# Patient Record
Sex: FEMALE | Race: WHITE | NOT HISPANIC OR LATINO | Employment: UNEMPLOYED | URBAN - METROPOLITAN AREA
[De-identification: names, ages, dates, MRNs, and addresses within clinical notes are randomized per-mention and may not be internally consistent; named-entity substitution may affect disease eponyms.]

---

## 2024-02-16 ENCOUNTER — OFFICE VISIT (OUTPATIENT)
Dept: URGENT CARE | Facility: CLINIC | Age: 59
End: 2024-02-16
Payer: MEDICAID

## 2024-02-16 VITALS
TEMPERATURE: 98 F | WEIGHT: 175 LBS | HEART RATE: 80 BPM | SYSTOLIC BLOOD PRESSURE: 158 MMHG | RESPIRATION RATE: 20 BRPM | OXYGEN SATURATION: 95 % | DIASTOLIC BLOOD PRESSURE: 102 MMHG
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
## 2024-02-16 PROCEDURE — 99204 OFFICE O/P NEW MOD 45 MIN: CPT | Mod: S$GLB,,,

## 2024-02-16 PROCEDURE — 73610 X-RAY EXAM OF ANKLE: CPT | Mod: RT,S$GLB,, | Performed by: RADIOLOGY

## 2024-02-16 PROCEDURE — 72040 X-RAY EXAM NECK SPINE 2-3 VW: CPT | Mod: 59,S$GLB,, | Performed by: RADIOLOGY

## 2024-02-16 PROCEDURE — 72100 X-RAY EXAM L-S SPINE 2/3 VWS: CPT | Mod: S$GLB,,, | Performed by: RADIOLOGY

## 2024-02-16 PROCEDURE — 73130 X-RAY EXAM OF HAND: CPT | Mod: 50,S$GLB,, | Performed by: RADIOLOGY

## 2024-02-16 PROCEDURE — 72040 X-RAY EXAM NECK SPINE 2-3 VW: CPT | Mod: S$GLB,,, | Performed by: RADIOLOGY

## 2024-02-16 PROCEDURE — 73562 X-RAY EXAM OF KNEE 3: CPT | Mod: RT,S$GLB,, | Performed by: RADIOLOGY

## 2024-02-16 PROCEDURE — 70150 X-RAY EXAM OF FACIAL BONES: CPT | Mod: S$GLB,,, | Performed by: RADIOLOGY

## 2024-02-16 NOTE — PROGRESS NOTES
Subjective:      Patient ID: Argentina Harvey is a 58 y.o. female.    Vitals:  weight is 79.4 kg (175 lb). Her oral temperature is 97.7 °F (36.5 °C). Her blood pressure is 158/102 (abnormal) and her pulse is 80. Her respiration is 20 and oxygen saturation is 95%.     Chief Complaint: Fall    Patient in clinic with a chief complaint of a trip and fall onto concrete on 02/08/2024.  She reports she tripped over a drain in a parking lot and falling onto her left side of her face, and striking bilateral hands, and right knee.  She has a associated right ankle pain from twisting ankle on concrete.  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  She reports time of accident she was unable to ambulate due to pain.  She has been in bed, unable to ambulate due to pain until today.  She has taken no medications for her symptoms.  Her range of motion is intact.  She has tenderness over the palm side MCP 2nd finger of bilateral hands.  Cervical tenderness radiates down to bilateral scapulas.  She reports midline pain.  Lower back pain does not radiate, also is midline.  She reports a clicking sensation in the knee when she ambulates.  She has a small abrasion that is in the healing process with no periwound erythema on the lateral aspect of the right knee.  She has no ecchymosis or bruising present.  There is mild effusion present.  Right ankle she has point tenderness over the anterior portion, and there is no ecchymosis or bruising present.  There is no obvious swelling.  She reports pain with range of motion.    Patient reports she does no intention of taking medications, and reports that she was hoping for a referral to physical therapy.  She plans on seeing chiropractor.  Patient was also offered EMS transport to hospital time injury, and declined.  She also refused emergency room care for multiple  "days in attempt to "nurse it myself".    She also reports history of cataract in left eye, and has appointment on Monday.    Fall  The accident occurred More than 1 week ago. The fall occurred while walking. She fell from a height of 3 to 5 ft. She landed on [place]. There was no blood loss. The point of impact was the face and right knee (Hands). The pain is present in the right knee, neck, right hand, left hand and right foot (pt states the right knee is "clicking" and swollen; pt also states that she has trouble "feeling her hands"). The pain is at a severity of 8/10. The pain is severe. The symptoms are aggravated by movement, standing and sitting. Associated symptoms include numbness, tingling and a visual change. Pertinent negatives include no abdominal pain, bowel incontinence, fever, headaches, hearing loss, hematuria, loss of consciousness, nausea or vomiting. She has tried rest, ice and NSAID (ibuprofen) for the symptoms. The treatment provided no relief.       Constitution: Negative for fever.   Neck: Positive for neck pain and neck stiffness.   Cardiovascular: Negative.    Eyes:  Positive for vision loss.   Respiratory: Negative.     Gastrointestinal:  Negative for abdominal pain, nausea, vomiting and bowel incontinence.   Endocrine: negative.   Genitourinary:  Negative for hematuria.   Musculoskeletal:  Positive for joint pain and back pain.   Skin:  Positive for abrasion.   Neurological:  Positive for numbness and tingling. Negative for headaches and loss of consciousness.   Psychiatric/Behavioral: Negative.        Objective:     Physical Exam   Constitutional: She is oriented to person, place, and time. She appears well-developed. She is cooperative.   HENT:   Head: Normocephalic and atraumatic.   Ears:   Right Ear: Hearing and external ear normal. impacted cerumen  Left Ear: Hearing and external ear normal. impacted cerumen  Nose: Nose normal. No mucosal edema or nasal deformity. No epistaxis. Right " sinus exhibits no maxillary sinus tenderness and no frontal sinus tenderness. Left sinus exhibits no maxillary sinus tenderness and no frontal sinus tenderness.   Mouth/Throat: Uvula is midline, oropharynx is clear and moist and mucous membranes are normal. Mucous membranes are moist. No trismus in the jaw. Normal dentition. No uvula swelling. Oropharynx is clear.   Eyes: Conjunctivae and lids are normal. Pupils are equal, round, and reactive to light. Right eye exhibits no chemosis and no exudate. Left eye exhibits no chemosis and no exudate. Right conjunctiva is not injected. Right conjunctiva has no hemorrhage. Left conjunctiva is not injected. Left conjunctiva has no hemorrhage. Left pupil is round, reactive and not sluggish. Extraocular movement intact      Comments: Left pupil hazy opacity   Neck: Trachea normal and phonation normal. Neck supple. There are signs of injury. pain with movement present. spinous process tenderness present. muscular tenderness present.   Cardiovascular: Normal rate, regular rhythm, normal heart sounds and normal pulses.   Pulmonary/Chest: Effort normal. No respiratory distress. She has decreased breath sounds (Diffuse).   Abdominal: Normal appearance. Soft. flat abdomen There is no abdominal tenderness.   Musculoskeletal:         General: Tenderness and signs of injury present.      Right knee: She exhibits effusion. She exhibits no ecchymosis and no erythema. Tenderness found. Lateral joint line tenderness noted.      Right ankle: She exhibits decreased range of motion. She exhibits no swelling, no ecchymosis and no deformity. Tenderness.      Cervical back: She exhibits tenderness.      Right hand: She exhibits tenderness. She exhibits normal range of motion. Decreased sensation noted. Decreased sensation is present in the radial distribution.      Left hand: She exhibits tenderness. She exhibits normal range of motion. Decreased sensation noted. Decreased sensation is present in  the radial distribution.        Legs:    Neurological: no focal deficit. She is alert, oriented to person, place, and time and at baseline. She exhibits normal muscle tone.   Skin: Skin is warm, dry and intact.   Psychiatric: Her speech is normal and behavior is normal. Mood, judgment and thought content normal.   Nursing note and vitals reviewed.      Assessment:     1. Fall, initial encounter        Plan:       Fall, initial encounter  -     X-Ray Hand 3 View Bilateral; Future; Expected date: 02/16/2024  -     XR Cervical Spine 2 or 3 Views; Future; Expected date: 02/16/2024  -     XR LUMBAR SPINE 2 OR 3 VIEWS; Future; Expected date: 02/16/2024  -     XR FACIAL BONES 3 OR MORE VIEW; Future; Expected date: 02/16/2024  -     XR KNEE 3 VIEW RIGHT; Future; Expected date: 02/16/2024  -     XR ANKLE COMPLETE 3 VIEW RIGHT; Future; Expected date: 02/16/2024  -     X-Ray Cervical Spine AP And Lateral; Future; Expected date: 02/16/2024  -     Ambulatory referral/consult to Physical/Occupational Therapy        XR KNEE 3 VIEW RIGHT    Result Date: 2/16/2024  EXAMINATION: XR KNEE 3 VIEW RIGHT CLINICAL HISTORY: Unspecified fall, initial encounter TECHNIQUE: AP, lateral, and Merchant views of the right knee were performed. COMPARISON: None FINDINGS: No fracture, dislocation, or joint effusion.  The soft tissues are unremarkable.     No acute osseous abnormality. Electronically signed by: Jhonatan Ryan MD Date:    02/16/2024 Time:    14:53    XR ANKLE COMPLETE 3 VIEW RIGHT    Result Date: 2/16/2024  EXAMINATION: XR ANKLE COMPLETE 3 VIEW RIGHT CLINICAL HISTORY: Unspecified fall, initial encounter TECHNIQUE: AP, lateral, and oblique images of the right ankle were performed. COMPARISON: None FINDINGS: There is no fracture or dislocation.  There is a moderate plantar heel spur.  The soft tissues are unremarkable.     No acute osseous abnormality. Electronically signed by: Jhonatan Ryan MD Date:    02/16/2024  Time:    14:53    XR FACIAL BONES 3 OR MORE VIEW    Result Date: 2/16/2024  EXAMINATION: XR FACIAL BONES 3 OR MORE VIEW CLINICAL HISTORY: Unspecified fall, initial encounter TECHNIQUE: Four views of the facial bones were performed. COMPARISON: None FINDINGS: No displaced fracture or focal bony abnormality.  No evidence of dislocation.  No abnormal bony lucencies or sclerosis.  No radiopaque foreign body identified.  Major paranasal sinuses appear relatively clear.     No acute radiographic abnormality identified. Electronically signed by: Phillip Rodriguez Date:    02/16/2024 Time:    14:42    XR Cervical Spine 2 or 3 Views    Result Date: 2/16/2024  EXAMINATION: XR CERVICAL SPINE 2 OR 3 VIEWS CLINICAL HISTORY: Unspecified fall, initial encounter TECHNIQUE: AP, lateral and open mouth views of the cervical spine were performed. COMPARISON: None. FINDINGS: Limited radiographs noting only an AP and slightly oblique view of the cervical spine.  No obvious displaced fracture identified.  Coronal alignment is maintained.     Limited radiographs of the cervical spine.  Lack of a lateral view limits evaluation significantly.  No obvious displaced fracture.  Consider repeat exam with lateral view of clinically indicated. Electronically signed by: Phillip Rodriguez Date:    02/16/2024 Time:    14:25         XR LUMBAR SPINE 2 OR 3 VIEWS     FINDINGS:  Vertebral body heights are preserved.  No fractures or bony destructive changes.  Trace retrolisthesis of L2 on L3, L3 on L4, and L5 on S1 in the setting of mild lower lumbar predominant facet arthrosis.  At least moderate degenerative disc height loss suggested from L2-L3 to L5-S1.     Impression:     1. Degenerative changes as discussed above without evidence of an acute radiographic abnormality.     Electronically signed by: Phillip Rodriguez  Date:                                            02/16/2024  Time:                                           15:24    Narrative &  Impression  EXAMINATION:  XR CERVICAL SPINE AP LATERAL     TECHNIQUE:  AP, lateral and open mouth views of the cervical spine were performed.     FINDINGS:  The C7 vertebral level is difficult to assess on the lateral view secondary to overlying shoulders.  No apparent fractures or bony destructive changes.Grade 1 degenerative anterolisthesis of C3 on C4 with straightening of the expected normal cervical lordosis.  Mild to moderate degenerative disc height loss at C5-C6 with shallow endplate centered osteophytes.No abnormal prevertebral soft tissue thickening.     Impression:     1. Degenerative changes as discussed above without evidence of an acute radiographic abnormality.

## 2024-09-12 ENCOUNTER — CLINICAL SUPPORT (OUTPATIENT)
Dept: REHABILITATION | Facility: HOSPITAL | Age: 59
End: 2024-09-12
Payer: MEDICAID

## 2024-09-12 DIAGNOSIS — R26.9 ABNORMALITY OF GAIT AND MOBILITY: ICD-10-CM

## 2024-09-12 DIAGNOSIS — R26.89 BALANCE PROBLEM: Primary | ICD-10-CM

## 2024-09-12 DIAGNOSIS — M25.50 PAIN IN JOINT, MULTIPLE SITES: ICD-10-CM

## 2024-09-12 PROCEDURE — 97161 PT EVAL LOW COMPLEX 20 MIN: CPT | Mod: PO

## 2024-09-12 NOTE — PATIENT INSTRUCTIONS
Shoulder Shrug        Slowly bring shoulders up toward ears, then back down.  Repeat ____ times. Do ____ sessions per day.     [email or web address]://[email or web address].k[employer]oblick.[employer]/452         Copyright © [employer]. All rights reserved.      Shoulder (Scapula) Retraction        Pull shoulders back, squeezing shoulder blades together.  Repeat ____ times per session. Do ____ sessions per week.  Position: Standing     Copyright © [employer]. All rights reserved.      Shoulder Bishop        Move shoulders up and around in a [place], forward and then backward.  Repeat ____ times. Do ____ sessions per day.     https://[email or web address]     Copyright © [employer]. All rights reserved.      Neck: Lateral Tilt        Support child's trunk and shoulder. Start with head in center.  Child gently tilts toward right shoulder. Keep chin tucked.  Do not allow trunk or shoulders to move.  Child may assist.  Hold ____ seconds. Repeat ____ times. Do ____ sessions per day.  CAUTION: Movement should be gentle, steady and slow.    Copyright © [employer]. All rights reserved.      Neck: Rotation        Support child's trunk and shoulder. Start with head in center.  Child gently turns chin toward right shoulder. Do not allow trunk or shoulder to move.  Child may assist.  Hold ____ seconds. Repeat ____ times. Do ____ sessions per day.  CAUTION: Movement should be gentle, steady and slow.    Copyright © [employer]. All rights reserved.      Stretch Break - Chin Tuck        Looking straight forward, tuck chin and hold ____ seconds. Relax and return to starting position.  Repeat ____ times every ____ hours.   (Home) Flexion: Pelvic Tilt        Lie with neck supported, knees bent, feet flat. Tighten and suck stomach in, pushing back down against surface. Do not push down with legs.  Repeat ____ times per set. Do ____ sets per session. Do ____ sessions per week.    Copyright © [employer]. All rights reserved.      Knee-to-Chest Stretch: Unilateral        With hand behind right knee, pull knee in to chest until a  comfortable stretch is felt in lower back and buttocks. Keep back relaxed. Hold ____ seconds.  Repeat ____ times per set. Do ____ sets per session. Do ____ sessions per day.

## 2024-09-12 NOTE — PLAN OF CARE
[facility]  Physical Therapy Neurological Rehabilitation Initial Evaluation     Name: Argentina Harvey  Clinic Number: [id number]    Therapy Diagnosis:   Encounter Diagnoses   Name Primary?    Balance problem Yes    Pain in joint, multiple sites     Abnormality of gait and mobility      Physician: Tommy Dinh FNP    Physician Orders: PT Eval and Treat   Medical Diagnosis from Referral: W19.XXXA (ICD-10-CM) - Fall, initial encounter   Evaluation Date: 9/12/2024  Authorization Period Expiration: 2/15/24  Plan of Care Expiration: 10/31/24    Visit # / Visits authorized: 1/ 1  FOTO: 11/ 100    Precautions: Standard and Fall. Blind left eye    Time In: 1330  Time Out: 1430  Total Billable Time: 60 minutes    Subjective      Date of onset: 2/18/24    referral    History of current condition - Argentina reports: having a bad fall with multiple bruising injury 7 months ago. Had been suffering from neck hip/low back discomforts which is worse lately.   C/o neuropathy to hands and feet. Unable to work the last  2 months. She mentioned pain medications doesn't work with her.     Imaging, Xray 2/16/24 Lumbar spine: having a bad fall with multiple bruising injury 7 months ago. Had been suffering from neck hip/low back discomforts which is worse lately.  Xray C-spine 2/16/24: The C7 vertebral level is difficult to assess on the lateral view secondary to overlying shoulders. No apparent fractures or bony destructive changes.Grade 1 degenerative anterolisthesis of C3 on C4 with straightening of the expected normal cervical lordosis. Mild to moderate degenerative disc height loss at C5-C6 with shallow endplate centered osteophytes.No abnormal prevertebral soft tissue thickening. For pain    Prior Therapy: yes. + chiropractor  Social History: lives alone.    Falls:  7 months ago. None recent   DME: none    Home Environment: 4 steps; tub shower   Exercise Routine / History:  used to go to gym 2x/week,  "swim  Occupation: healthcare consultant  Prior Level of Function: independent  Current Level of Function: unable to go to work, difficulty walking, unable to stand long period. Sometimes takes too long to get out of bed.    Pain:  Current 8/10, worst 10/10, best 4/10   Location: neck, low back, hip   Description: Tight, pressure  Aggravating Factors: Standing, Walking, Morning, and quick movements  Easing Factors: ice, heating pad, hot bath, and biofreeze    Patient's goals: "    Medical History:   No past medical history on file.    Surgical History:   Argentina Harvey  has no past surgical history on file.    Medications:   Argentina has a current medication list which includes the following prescription(s): stelara.    Allergies:   Review of patient's allergies indicates:   Allergen Reactions    Percodan [oxycodone-aspirin] Nausea And Vomiting and Hallucinations        Objective      Posture: Assymetric weight bearing L>R, R sh/hip lower  Palpation: sensitive  and reactive on pressure to neck and low back areas.  Sensation: reports tingling and numbness to both hands and feet  Range of Motion/Strength:   Cervical/Thoracic/Lumbar AROM: Pain/Dysfunction with Movement:   Flexion    Extension    Right side bending    Left side bending    Right rotation    Left rotation      Cervical/Thoracic/Lumbar AROM: Pain/Dysfunction with Movement:   Flexion    Extension    Right side bending    Left side bending    Right rotation    Left rotation       Flexibility: severe guarding with all active movements, neck/trunk;  SLR  R 50 deg  L 70 deg  Gait: Without AD  Analysis: Unsteady gait with reaching wall for support, assymetric weight shifting/swing  Bed Mobility: modified independent  Transfers: Independent  Special Tests: SLR  (-); FABERe (-)  Spurling's deferred    Intake Outcome Measure for FOTO hip Survey    Therapist reviewed FOTO scores for Argentina Harvey on 9/12/2024.   FOTO report - see Media section or FOTO account episode " details.    Intake Score: 11%       Treatment     Total Treatment time separate from Evaluation: 15 minutes    Argentina received the treatments listed below:      therapeutic exercises to develop flexibility and establish home program for 15 minutes including:  Shoulder shrugs  Scapular retraction   \chin tuck  Neck ROM: rotation, lateral titl  Posterior pelvic tilt in supine'\  Knee to chest single      Patient Education and Home Exercises     Education provided:   - Discussed Plan of care; Home exercise Instructions    Written Home Exercises Provided: Yes.  Exercises were reviewed and Argentina was able to demonstrate them prior to the end of the session.  Argentina demonstrated fair  understanding of the education provided.     Assessment     Argentina is a 59 y.o. female referred to outpatient Physical Therapy with a medical diagnosis of fall, initial encounter. Patient presents with multiple areas of pain, very unsteady gait, assymmetric weight bearing L>R,  guarded  movements, reacts to palpation avoiding tenderness. Decreased overall mobility  Patient prognosis is Good.   Patient will benefit from skilled outpatient Physical Therapy to address the deficits stated above and in the chart below, provide patient /family education, and to maximize patient's level of independence.     Plan of care discussed with patient: Yes  Patient's spiritual, cultural and educational needs considered and patient is agreeable to the plan of care and goals as stated below:     Anticipated Barriers for therapy: anxiety, attitude    Medical Necessity is demonstrated by the following  History  Co-morbidities and personal factors that may impact the plan of care [x] LOW: no personal factors / co-morbidities  [] MODERATE: 1-2 personal factors / co-morbidities  [] HIGH: 3+ personal factors / co-morbidities    Moderate / High Support Documentation:   Co-morbidities affecting plan of care: obesity    Personal Factors:   coping style      Examination  Body Structures and Functions, activity limitations and participation restrictions that may impact the plan of care [x] LOW: addressing 1-2 elements  [] MODERATE: 3+ elements  [] HIGH: 4+ elements (please support below)    Moderate / High Support Documentation: blind left eye     Clinical Presentation [x] LOW: stable  [] MODERATE: Evolving  [] HIGH: Unstable     Decision Making/ Complexity Score: low       Goals:  Short Term Goals: 2 weeks   Patient will report compliance to home exercises given.  Patient will tolerate continuous movements 10 minutes on Nustep.  Patient will tolerate unilateral standing to right leg > 10 sec with hold for balance.    Long Term Goals: 6 weeks   Patient will approximate normal gait pattern.  Patient will have decreased pain intensity <3/10 max level  Patient will perform single leg stance X 10 sec without hold to demonstrate functional balance.  Patient will have improved FOTO score < 35/100    Plan     Plan of care Certification: 9/12/2024 to 10/31/24.    Outpatient Physical Therapy 2 times weekly for 6 weeks to include the following interventions: Electrical Stimulation Dry needling, Gait Training, Manual Therapy, Moist Heat/ Ice, Neuromuscular Re-ed, Therapeutic Activities, Therapeutic Exercise, and Ultrasound.     Parag Goyal PT        Physician's Signature: _________________________________________ Date: ________________

## 2024-09-18 ENCOUNTER — TELEPHONE (OUTPATIENT)
Dept: REHABILITATION | Facility: HOSPITAL | Age: 59
End: 2024-09-18

## 2024-09-20 ENCOUNTER — CLINICAL SUPPORT (OUTPATIENT)
Dept: REHABILITATION | Facility: HOSPITAL | Age: 59
End: 2024-09-20
Payer: MEDICAID

## 2024-09-20 DIAGNOSIS — R53.81 DEBILITY: Primary | ICD-10-CM

## 2024-09-20 PROCEDURE — 97110 THERAPEUTIC EXERCISES: CPT | Mod: PO

## 2024-09-20 NOTE — PROGRESS NOTES
[facility]   Physical Therapy Treatment Note      Name: Argentina Harvey  Clinic Number: [id number]    Therapy Diagnosis: No diagnosis found.  Physician: Tommy Dinh FNP    Visit Date: 9/20/2024    [copy and paste header from eval here including time in/out and billable time]  Physician Orders: PT Eval and Treat   Medical Diagnosis from Referral: W19.XXXA (ICD-10-CM) - Fall, initial encounter   Evaluation Date: 9/12/2024  Authorization Period Expiration: 2/15/24  Plan of Care Expiration: 10/31/24     Visit # / Visits authorized: 1/ 1  FOTO: 11/ 100     Precautions: Standard and Fall. Blind left eye     Time In: 1035  Time Out: 1113  Total Billable Time: 38 minutes  PTA Visit #: 0/5     Subjective     Patient reports: c/o of multiple areas,upper back, right hip, low back.  She was compliant with home exercise program.  Response to previous treatment: first visit following eval  Functional change: no    Pain: 8/10  Location: left ankle     Objective      Objective Measures updated at progress report unless specified.     Treatment     Argentina received the treatments listed below:      therapeutic exercises to develop strength, endurance, ROM, flexibility, posture, and core stabilization for 38 minutes including:  Bike 4'  UBE 4'  Mini squat 8  LAQ 20  Functional gait training 2x120'.  manual therapy techniques:  were applied to the:  for  minutes, including:      neuromuscular re-education activities to improve:  for 0 minutes. The following activities were included:      therapeutic activities to improve functional performance for 0  minutes, including:      gait training to improve functional mobility and safety for   minutes, including:      direct contact modalities after being cleared for contraindications:     supervised modalities after being cleared for contradictions: TENS:  Argentina received TENS electrical stimulation for pain to the . Pt received continuous mode at a rate of 2  pps for 0 minutes. Argentina tolerated treatment well without any adverse effects.     Patient Education and Home Exercises       Education provided:   - Gait pattern ed    Written Home Exercises Provided:  to be provided .   Assessment     Antalgic gait.  Very, poor endurance, required ,multiple rest periods.    Argentina Is progressing well towards her goals.   Patient prognosis is Fair.     Patient will continue to benefit from skilled outpatient physical therapy to address the deficits listed in the problem list box on initial evaluation, provide pt/family education and to maximize pt's level of independence in the home and community environment.     Patient's spiritual, cultural and educational needs considered and pt agreeable to plan of care and goals.     Anticipated barriers to physical therapy: no    Goals:   Short Term Goals: 2 weeks   Patient will report compliance to home exercises given.  Patient will tolerate continuous movements 10 minutes on Nustep.  Patient will tolerate unilateral standing to right leg > 10 sec with hold for balance.     Long Term Goals: 6 weeks   Patient will approximate normal gait pattern.  Patient will have decreased pain intensity <3/10 max level  Patient will perform single leg stance X 10 sec without hold to demonstrate functional balance.  Patient will have improved FOTO score < 35/100     Plan     Progress as able.    Jarad Marinelli, PT

## 2024-10-22 ENCOUNTER — TELEPHONE (OUTPATIENT)
Dept: REHABILITATION | Facility: HOSPITAL | Age: 59
End: 2024-10-22
Payer: MEDICAID

## 2024-10-22 NOTE — TELEPHONE ENCOUNTER
Spoke with pt re: missed appts. Stated she has covid right now and is very sick, so she wants to cancel her appts today and Thursday. Confirmed her appt Tuesday and informed pt that is the last scheduled appt for this POC.

## 2025-01-30 PROBLEM — M77.32 CALCANEAL SPUR OF BOTH FEET: Status: ACTIVE | Noted: 2025-01-30

## 2025-01-30 PROBLEM — L40.50 PSA (PSORIATIC ARTHRITIS): Status: ACTIVE | Noted: 2025-01-30

## 2025-01-30 PROBLEM — M47.816 DEGENERATIVE ARTHRITIS OF LUMBAR SPINE: Status: ACTIVE | Noted: 2025-01-30

## 2025-01-30 PROBLEM — L40.9 PSORIASIS: Status: ACTIVE | Noted: 2025-01-30

## 2025-01-30 PROBLEM — M47.812 DEGENERATIVE ARTHRITIS OF CERVICAL SPINE: Status: ACTIVE | Noted: 2025-01-30

## 2025-01-30 PROBLEM — M77.31 CALCANEAL SPUR OF BOTH FEET: Status: ACTIVE | Noted: 2025-01-30

## 2025-01-30 PROBLEM — M25.50 PAIN IN JOINT, MULTIPLE SITES: Status: RESOLVED | Noted: 2024-09-12 | Resolved: 2025-01-30

## 2025-05-07 ENCOUNTER — OUTPATIENT CASE MANAGEMENT (OUTPATIENT)
Dept: ADMINISTRATIVE | Facility: OTHER | Age: 60
End: 2025-05-07
Payer: MEDICAID

## 2025-05-09 ENCOUNTER — OUTPATIENT CASE MANAGEMENT (OUTPATIENT)
Dept: ADMINISTRATIVE | Facility: OTHER | Age: 60
End: 2025-05-09
Payer: MEDICAID

## 2025-05-13 ENCOUNTER — OUTPATIENT CASE MANAGEMENT (OUTPATIENT)
Dept: ADMINISTRATIVE | Facility: OTHER | Age: 60
End: 2025-05-13
Payer: MEDICAID

## 2025-05-13 NOTE — PROGRESS NOTES
Outpatient Care Management   - Patient Assessment    Patient: Argentina Harvey  MRN:  [id number]  Date of Service:  5/13/2025  Completed by:  JIMMY Corral  Referral Date: 05/01/2025    Reason for Visit   Patient presents with    OPCM Enrollment Call    Social Work Assessment       Brief Summary:  received a referral from outpatient provider for the following Low/Mod SW psychosocial needs SW received consult from  regarding assisting pt with ADL's. SW made phone call to pt([phone])informed her of the consult from the MD. Pt informed SW is aware of the consult from MD but she need assist with a scooter.Pt informed SW she has price some scooter and they are 700 dollars. Pt informed SW need one for walking distance and shopping. Pt informed OVI has a history of psoriatic arthritis,hypertension,bronchitis,pre diabetes and chronic fatigue syndrome. Pt informed SW she can walk short distance for mobility. Pt informed OVI does need assist with her ADL's. Pt informed OVI lives with her brother and he help take care of her. Pt informed OVI does not have any children. Pt informed  OVI has no income. Pt informed OVI will be applying for disability.SW discuss with pt the [facility] and some of the services that they provide which are: meals on wheels, services,transportation services and the [facility] [employer] [place] Program. SW provide pt with the phone number to both agency:[phone] and [phone]. Pt informed OVI has receive meals on wheels in the past but not currently. Pt informed OVI will reapply for food stamps. SW discuss with pt our outpatient case management program and its advantages and ask would she like to be enroll;per pt yes. SW enroll pt in the program.SW will continue to assist pt with any needs or concerns. Care plan was created in collaboration with patient/caregiver input.   completed the SDOH questionnaire.      JIMMY Corral  Desk:[phone]  Fax:[phone]

## 2025-05-21 ENCOUNTER — OUTPATIENT CASE MANAGEMENT (OUTPATIENT)
Dept: ADMINISTRATIVE | Facility: OTHER | Age: 60
End: 2025-05-21
Payer: MEDICAID

## 2025-05-21 NOTE — PROGRESS NOTES
Outpatient Care Management   - Care Plan Follow Up    Patient: Argentina Harvey  MRN:  [id number]  Date of Service:  5/21/2025  Completed by:  JIMMY Corral  Referral Date: 05/01/2025    Reason for Visit   Patient presents with    OPCM SW First Assessment Attempt    OPCM SW Follow Up Call       Brief Summary: SW made follow up call to pt([phone])unable to reach left her a message with SW contact information for a call back. SW will continue to follow up.    Complex Care Plan     Care plan was discussed and completed today with input from patient and/or caregiver.    Patient Instructions     No follow-ups on file.

## 2025-05-21 NOTE — PROGRESS NOTES
Outpatient Care Management   - Care Plan Follow Up    Patient: Argentina Harvey  MRN:  [id number]  Date of Service:  5/21/2025  Completed by:  JIMMY Corral  Referral Date: 05/01/2025    Reason for Visit   Patient presents with    OPCM SW Follow Up Call       Brief Summary: SW received phone call from pt and pt informed SW was returning a call . SW informed pt  have spoke with [employer] regarding a scooter. SW was informed medicaid does not pay for a scooter only medicare.SW discuss with pt regarding [employer] where a person can buy a scooter. SW provide pt with a place her in [facility] and the phone number to call. Pt verbalize understanding. Pt informed SW will look and see what she can find. SW ask pt was there any needs or concerns at this time. Pt informed SW not at this time.     JIMYM Corral  Desk:[phone]  Fax:[phone]    Complex Care Plan     Care plan was discussed and completed today with input from patient and/or caregiver.    Patient Instructions     No follow-ups on file.

## 2025-05-30 ENCOUNTER — OUTPATIENT CASE MANAGEMENT (OUTPATIENT)
Dept: ADMINISTRATIVE | Facility: OTHER | Age: 60
End: 2025-05-30
Payer: MEDICAID

## 2025-05-30 NOTE — PROGRESS NOTES
Outpatient Care Management   - Care Plan Follow Up    Patient: Argentina Harvey  MRN:  [id number]  Date of Service:  5/30/2025  Completed by:  JIMMY Corral  Referral Date: 05/01/2025    Reason for Visit   Patient presents with    OPCM SW Follow Up Call       Brief Summary: SW made a follow up call to pt([phone])unable to reach left her a message with SW contact information for a call back. OVI will continue to assist pt with any needs or concerns.    JIMMY Corral  Desk:[phone]  Fax:[phone]    Complex Care Plan     Care plan was discussed and completed today with input from patient and/or caregiver.    Patient Instructions     No follow-ups on file.

## 2025-06-13 ENCOUNTER — OUTPATIENT CASE MANAGEMENT (OUTPATIENT)
Dept: ADMINISTRATIVE | Facility: OTHER | Age: 60
End: 2025-06-13
Payer: MEDICAID

## 2025-06-13 NOTE — PROGRESS NOTES
Outpatient Care Management   - Care Plan Follow Up    Patient: Argentina Harvey  MRN:  [id number]  Date of Service:  6/13/2025  Completed by:  JIMMY Corral  Referral Date: 05/01/2025    Reason for Visit   Patient presents with    OPCM SW Follow Up Call       Brief Summary: SW made a follow up call to pt([phone])unable to reach left her a message with SW contact information for a call back. SW will continue to follow up.    JIMMY Corral  Desk:[phone]  Fax:[phone]    Complex Care Plan     Care plan was discussed and completed today with input from patient and/or caregiver.    Patient Instructions     No follow-ups on file.

## 2025-06-27 ENCOUNTER — OUTPATIENT CASE MANAGEMENT (OUTPATIENT)
Dept: ADMINISTRATIVE | Facility: OTHER | Age: 60
End: 2025-06-27
Payer: MEDICAID

## 2025-06-27 NOTE — PROGRESS NOTES
.Outpatient Care Management   - Care Plan Follow Up    Patient: Argentina Harvey  MRN:  [id number]  Date of Service:  6/27/2025  Completed by:  JIMMY Corral  Referral Date: 05/01/2025    Reason for Visit   Patient presents with    OPCM SW Follow Up Call       Brief Summary: SW made a follow up call to pt([phone]). Pt informed SW was driving and unable to talk at this time. SW will continue to follow up with pt for any needs or concerns.    JIMMY Corral  Desk:[phone]  Fax:[phone]    Complex Care Plan     Care plan was discussed and completed today with input from patient and/or caregiver.    Patient Instructions     No follow-ups on file.

## 2025-07-08 ENCOUNTER — OUTPATIENT CASE MANAGEMENT (OUTPATIENT)
Dept: ADMINISTRATIVE | Facility: OTHER | Age: 60
End: 2025-07-08
Payer: MEDICAID

## 2025-07-08 NOTE — PROGRESS NOTES
SW made a follow up call to pt([phone])unable to reach left pt a message with SW contact information for a call back. OVI will continue to assist pt with any needs or concerns.    JIMMY Corral  Desk:[phone]  Fax:[phone]

## 2025-07-17 ENCOUNTER — OUTPATIENT CASE MANAGEMENT (OUTPATIENT)
Dept: ADMINISTRATIVE | Facility: OTHER | Age: 60
End: 2025-07-17
Payer: MEDICAID